# Patient Record
Sex: FEMALE | Race: BLACK OR AFRICAN AMERICAN | NOT HISPANIC OR LATINO | URBAN - METROPOLITAN AREA
[De-identification: names, ages, dates, MRNs, and addresses within clinical notes are randomized per-mention and may not be internally consistent; named-entity substitution may affect disease eponyms.]

---

## 2020-12-01 ENCOUNTER — OFFICE VISIT (OUTPATIENT)
Dept: URBAN - METROPOLITAN AREA CLINIC 118 | Facility: CLINIC | Age: 55
End: 2020-12-01

## 2021-01-21 ENCOUNTER — OFFICE VISIT (OUTPATIENT)
Dept: URBAN - METROPOLITAN AREA CLINIC 118 | Facility: CLINIC | Age: 56
End: 2021-01-21

## 2021-03-08 ENCOUNTER — OFFICE VISIT (OUTPATIENT)
Dept: URBAN - METROPOLITAN AREA CLINIC 118 | Facility: CLINIC | Age: 56
End: 2021-03-08
Payer: OTHER GOVERNMENT

## 2021-03-08 VITALS
HEIGHT: 61 IN | TEMPERATURE: 96.5 F | HEART RATE: 73 BPM | SYSTOLIC BLOOD PRESSURE: 197 MMHG | BODY MASS INDEX: 34.78 KG/M2 | WEIGHT: 184.2 LBS | DIASTOLIC BLOOD PRESSURE: 99 MMHG

## 2021-03-08 DIAGNOSIS — K62.89 RECTAL PAIN: ICD-10-CM

## 2021-03-08 DIAGNOSIS — L29.0 RECTAL ITCHING: ICD-10-CM

## 2021-03-08 DIAGNOSIS — K64.0 GRADE I HEMORRHOIDS: ICD-10-CM

## 2021-03-08 DIAGNOSIS — Z83.71 FAMILY HISTORY OF COLONIC POLYPS: ICD-10-CM

## 2021-03-08 PROCEDURE — 99203 OFFICE O/P NEW LOW 30 MIN: CPT | Performed by: INTERNAL MEDICINE

## 2021-03-08 PROCEDURE — 46221 LIGATION OF HEMORRHOID(S): CPT | Performed by: INTERNAL MEDICINE

## 2021-03-08 NOTE — PHYSICAL EXAM GASTROINTESTINAL
Abdomen , soft, nontender, nondistended , no guarding or rigidity , no masses palpable , normal bowel sounds  Rectal exam RAVEN Mendoza present as a chaperone during the exam.  perianal area with irrigated redundant skin. No external hemorrhoids seen.  Anoscope exam done showing mild 2 columns of internal hemorrhoids, nonbleeding.  Hemorrhoidal banding performed using [facility] banding system at right anterior column.  No immediate complications noted. Patient tolerated it well.

## 2021-03-08 NOTE — HPI-TODAY'S VISIT:
This is a 56 yo female with pmh of HTN, DM, and migraine here for evaluation for rectal pain/itching/bleeding. Patient previously had colonoscopy in 2017 with Dr. Sneed with no colon polyps and hemorrhoids banding x 5 done.  For the past 4-5 months, she has had rectal/anal itching, leading to scratching and bleeding. No rectal bleeding with BM. She denies any abdominal pain or constipation.  Reports mother with colon polyps and grandmother with colon cancer.

## 2021-04-19 ENCOUNTER — OFFICE VISIT (OUTPATIENT)
Dept: URBAN - METROPOLITAN AREA CLINIC 118 | Facility: CLINIC | Age: 56
End: 2021-04-19
Payer: OTHER GOVERNMENT

## 2021-04-19 DIAGNOSIS — K64.8 INTERNAL HEMORRHOIDS WITHOUT MENTION OF COMPLICATION: ICD-10-CM

## 2021-04-19 DIAGNOSIS — K62.89 RECTAL PAIN: ICD-10-CM

## 2021-04-19 DIAGNOSIS — Z83.71 FAMILY HISTORY OF COLONIC POLYPS: ICD-10-CM

## 2021-04-19 DIAGNOSIS — K64.9 HEMORRHOIDS WITHOUT COMPLICATION: ICD-10-CM

## 2021-04-19 DIAGNOSIS — L29.0 RECTAL ITCHING: ICD-10-CM

## 2021-04-19 PROCEDURE — 99213 OFFICE O/P EST LOW 20 MIN: CPT | Performed by: INTERNAL MEDICINE

## 2021-04-19 NOTE — PHYSICAL EXAM GASTROINTESTINAL
Abdomen,  soft, nontender, nondistended,  no guarding or rigidity,  no masses palpable,  normal bowel sounds,

## 2021-04-19 NOTE — PHYSICAL EXAM HENT:
Head,  normocephalic,  atraumatic,  Face,  Face within normal limits,  Ears,  External ears within normal limits,

## 2021-04-19 NOTE — HPI-TODAY'S VISIT:
This is a 56 yo female with pmh of HTN, DM, and migraine here for follow up for rectal pain/itching/bleeding  At her last visit, she had hemorrhoidal banding with [facility]. She reports pressure sensation for some time but improved. No additional rectal bleeding. Her itching has much improved and no additional rectal pain.  Reports regular bowel habits.  Patient previously had colonoscopy in 2017 with Dr. Sneed with no colon polyps and hemorrhoids banding x 5 done.

## 2021-12-09 ENCOUNTER — OFFICE VISIT (OUTPATIENT)
Dept: URBAN - METROPOLITAN AREA CLINIC 118 | Facility: CLINIC | Age: 56
End: 2021-12-09
Payer: OTHER GOVERNMENT

## 2021-12-09 ENCOUNTER — LAB OUTSIDE AN ENCOUNTER (OUTPATIENT)
Dept: URBAN - METROPOLITAN AREA CLINIC 118 | Facility: CLINIC | Age: 56
End: 2021-12-09

## 2021-12-09 DIAGNOSIS — Z83.71 FAMILY HISTORY OF COLONIC POLYPS: ICD-10-CM

## 2021-12-09 DIAGNOSIS — L29.0 RECTAL ITCHING: ICD-10-CM

## 2021-12-09 DIAGNOSIS — K52.9 CHRONIC DIARRHEA: ICD-10-CM

## 2021-12-09 DIAGNOSIS — K64.8 INTERNAL HEMORRHOIDS WITHOUT MENTION OF COMPLICATION: ICD-10-CM

## 2021-12-09 DIAGNOSIS — K58.0 IRRITABLE BOWEL SYNDROME WITH DIARRHEA: ICD-10-CM

## 2021-12-09 DIAGNOSIS — K64.9 HEMORRHOIDS WITHOUT COMPLICATION: ICD-10-CM

## 2021-12-09 DIAGNOSIS — K80.20 CALCULUS OF GALLBLADDER WITHOUT CHOLECYSTITIS WITHOUT OBSTRUCTION: ICD-10-CM

## 2021-12-09 DIAGNOSIS — K62.89 RECTAL PAIN: ICD-10-CM

## 2021-12-09 PROCEDURE — 99214 OFFICE O/P EST MOD 30 MIN: CPT | Performed by: INTERNAL MEDICINE

## 2021-12-09 RX ORDER — CHOLESTYRAMINE 4 G/9G
1 SCOOP POWDER, FOR SUSPENSION ORAL
Qty: 60 | Refills: 2 | OUTPATIENT
Start: 2021-12-09

## 2021-12-09 NOTE — HPI-TODAY'S VISIT:
This is a 55 yo female with pmh of HTN, DM, and migraine here for follow up visit.  Today, she is here after a recent CT spine, which showed enlarged gallbladder with gallstones and sludge.  She has no RUQ pain or nausea/vomiting.  She has had chronic frequent stools. After each meals, she has a BM within 1-2 hours. At times her stools are loose. This has been chronic for past several years. She is on metformin and trulicity for DM.   Patient previously had colonoscopy in 2017 with Dr. Sneed with no colon polyps and hemorrhoids banding x 5 done.

## 2022-01-31 ENCOUNTER — OFFICE VISIT (OUTPATIENT)
Dept: URBAN - METROPOLITAN AREA CLINIC 117 | Facility: CLINIC | Age: 57
End: 2022-01-31
Payer: OTHER GOVERNMENT

## 2022-01-31 DIAGNOSIS — K80.20 GALLSTONE: ICD-10-CM

## 2022-01-31 PROCEDURE — 76705 ECHO EXAM OF ABDOMEN: CPT | Performed by: INTERNAL MEDICINE

## 2022-01-31 RX ORDER — CHOLESTYRAMINE 4 G/9G
1 SCOOP POWDER, FOR SUSPENSION ORAL
Qty: 60 | Refills: 2 | Status: ACTIVE | COMMUNITY
Start: 2021-12-09

## 2022-02-11 ENCOUNTER — OFFICE VISIT (OUTPATIENT)
Dept: URBAN - METROPOLITAN AREA CLINIC 118 | Facility: CLINIC | Age: 57
End: 2022-02-11
Payer: OTHER GOVERNMENT

## 2022-02-11 DIAGNOSIS — Z83.71 FAMILY HISTORY OF COLONIC POLYPS: ICD-10-CM

## 2022-02-11 DIAGNOSIS — L29.0 RECTAL ITCHING: ICD-10-CM

## 2022-02-11 DIAGNOSIS — K76.0 FATTY LIVER: ICD-10-CM

## 2022-02-11 DIAGNOSIS — K64.9 HEMORRHOIDS WITHOUT COMPLICATION: ICD-10-CM

## 2022-02-11 DIAGNOSIS — K64.8 INTERNAL HEMORRHOIDS WITHOUT MENTION OF COMPLICATION: ICD-10-CM

## 2022-02-11 DIAGNOSIS — K80.20 CALCULUS OF GALLBLADDER WITHOUT CHOLECYSTITIS WITHOUT OBSTRUCTION: ICD-10-CM

## 2022-02-11 DIAGNOSIS — K62.89 RECTAL PAIN: ICD-10-CM

## 2022-02-11 DIAGNOSIS — K58.0 IRRITABLE BOWEL SYNDROME WITH DIARRHEA: ICD-10-CM

## 2022-02-11 DIAGNOSIS — K52.9 CHRONIC DIARRHEA: ICD-10-CM

## 2022-02-11 PROCEDURE — 99213 OFFICE O/P EST LOW 20 MIN: CPT | Performed by: INTERNAL MEDICINE

## 2022-02-11 RX ORDER — CHOLESTYRAMINE 4 G/9G
1 SCOOP POWDER, FOR SUSPENSION ORAL
Qty: 60 | Refills: 2 | OUTPATIENT

## 2022-02-11 RX ORDER — CHOLESTYRAMINE 4 G/9G
1 SCOOP POWDER, FOR SUSPENSION ORAL
Qty: 60 | Refills: 2 | Status: DISCONTINUED | COMMUNITY
Start: 2021-12-09

## 2022-02-11 NOTE — HPI-TODAY'S VISIT:
This is a 57 yo female with pmh of HTN, DM, and migraine here for follow up visit.  Since her last visit, she had US, which showed gallstones and sludge as well as fatty liver. Otherwise normal US.  She reports her stools have been improving with cholestyramine. No rectal bleeding or weight loss.  Denies any RUQ pain or nausea/vomiting.

## 2022-02-12 LAB
A/G RATIO: 1.3
ALBUMIN: 4.4
ALKALINE PHOSPHATASE: 64
ALT (SGPT): 18
AST (SGOT): 15
BILIRUBIN, TOTAL: 0.4
BUN/CREATININE RATIO: 14
BUN: 16
CALCIUM: 10
CARBON DIOXIDE, TOTAL: 24
CHLORIDE: 101
CREATININE: 1.13
EGFR IF AFRICN AM: 62
EGFR IF NONAFRICN AM: 54
GLOBULIN, TOTAL: 3.3
GLUCOSE: 108
HBSAG SCREEN: NEGATIVE
HCV AB: 0.1
HEP A AB, IGM: NEGATIVE
HEP B CORE AB, IGM: NEGATIVE
INTERPRETATION:: (no result)
POTASSIUM: 4.2
PROTEIN, TOTAL: 7.7
SODIUM: 144

## 2022-05-12 ENCOUNTER — OFFICE VISIT (OUTPATIENT)
Dept: URBAN - METROPOLITAN AREA CLINIC 118 | Facility: CLINIC | Age: 57
End: 2022-05-12
Payer: OTHER GOVERNMENT

## 2022-05-12 ENCOUNTER — LAB OUTSIDE AN ENCOUNTER (OUTPATIENT)
Dept: URBAN - METROPOLITAN AREA CLINIC 118 | Facility: CLINIC | Age: 57
End: 2022-05-12

## 2022-05-12 ENCOUNTER — DASHBOARD ENCOUNTERS (OUTPATIENT)
Age: 57
End: 2022-05-12

## 2022-05-12 DIAGNOSIS — K64.8 INTERNAL HEMORRHOIDS WITHOUT MENTION OF COMPLICATION: ICD-10-CM

## 2022-05-12 DIAGNOSIS — K58.0 IRRITABLE BOWEL SYNDROME WITH DIARRHEA: ICD-10-CM

## 2022-05-12 DIAGNOSIS — K64.9 HEMORRHOIDS WITHOUT COMPLICATION: ICD-10-CM

## 2022-05-12 DIAGNOSIS — Z83.71 FAMILY HISTORY OF COLONIC POLYPS: ICD-10-CM

## 2022-05-12 DIAGNOSIS — L29.0 RECTAL ITCHING: ICD-10-CM

## 2022-05-12 DIAGNOSIS — K62.89 RECTAL PAIN: ICD-10-CM

## 2022-05-12 DIAGNOSIS — K80.20 CALCULUS OF GALLBLADDER WITHOUT CHOLECYSTITIS WITHOUT OBSTRUCTION: ICD-10-CM

## 2022-05-12 DIAGNOSIS — K76.0 FATTY LIVER: ICD-10-CM

## 2022-05-12 PROCEDURE — 99213 OFFICE O/P EST LOW 20 MIN: CPT | Performed by: INTERNAL MEDICINE

## 2022-05-12 RX ORDER — POLYETHYLENE GLYCOL 3350, SODIUM SULFATE, SODIUM CHLORIDE, POTASSIUM CHLORIDE, ASCORBIC ACID, SODIUM ASCORBATE 140-9-5.2G
AS DIRECTED KIT ORAL ONCE
Qty: 1 | Refills: 0 | OUTPATIENT
Start: 2022-05-12 | End: 2022-05-13

## 2022-05-12 RX ORDER — CHOLESTYRAMINE 4 G/9G
1 SCOOP POWDER, FOR SUSPENSION ORAL
Qty: 60 | Refills: 2 | OUTPATIENT

## 2022-05-12 RX ORDER — CHOLESTYRAMINE 4 G/9G
1 SCOOP POWDER, FOR SUSPENSION ORAL
Qty: 60 | Refills: 2 | Status: ACTIVE | COMMUNITY

## 2022-05-12 NOTE — HPI-TODAY'S VISIT:
This is a 57 yo female with pmh of HTN, DM, and migraine here for follow up visit.  Doing well with diarrhea well controlled. Takes cholestyramine once daily. No abdominal pain or nausea/vomiting.

## 2022-05-13 PROBLEM — 197321007: Status: ACTIVE | Noted: 2022-02-11

## 2022-05-13 PROBLEM — 70342003: Status: ACTIVE | Noted: 2021-12-09

## 2022-05-13 PROBLEM — 197125005: Status: ACTIVE | Noted: 2021-12-09

## 2022-08-03 ENCOUNTER — OFFICE VISIT (OUTPATIENT)
Dept: URBAN - METROPOLITAN AREA SURGERY CENTER 23 | Facility: SURGERY CENTER | Age: 57
End: 2022-08-03
Payer: OTHER GOVERNMENT

## 2022-08-03 ENCOUNTER — CLAIMS CREATED FROM THE CLAIM WINDOW (OUTPATIENT)
Dept: URBAN - METROPOLITAN AREA CLINIC 4 | Facility: CLINIC | Age: 57
End: 2022-08-03
Payer: OTHER GOVERNMENT

## 2022-08-03 DIAGNOSIS — Z83.71 FAMILY HISTORY OF COLON POLYPS: ICD-10-CM

## 2022-08-03 DIAGNOSIS — D12.4 ADENOMA OF DESCENDING COLON: ICD-10-CM

## 2022-08-03 DIAGNOSIS — K63.89 OTHER SPECIFIED DISEASES OF INTESTINE: ICD-10-CM

## 2022-08-03 DIAGNOSIS — D12.4 BENIGN NEOPLASM OF DESCENDING COLON: ICD-10-CM

## 2022-08-03 DIAGNOSIS — Z12.11 COLON CANCER SCREENING: ICD-10-CM

## 2022-08-03 PROCEDURE — 45380 COLONOSCOPY AND BIOPSY: CPT | Performed by: INTERNAL MEDICINE

## 2022-08-03 PROCEDURE — 45385 COLONOSCOPY W/LESION REMOVAL: CPT | Performed by: INTERNAL MEDICINE

## 2022-08-03 PROCEDURE — 88305 TISSUE EXAM BY PATHOLOGIST: CPT | Performed by: PATHOLOGY

## 2022-08-03 PROCEDURE — G8907 PT DOC NO EVENTS ON DISCHARG: HCPCS | Performed by: INTERNAL MEDICINE

## 2022-08-03 RX ORDER — CHOLESTYRAMINE 4 G/9G
1 SCOOP POWDER, FOR SUSPENSION ORAL
Qty: 60 | Refills: 2 | Status: ACTIVE | COMMUNITY

## 2025-08-13 ENCOUNTER — OFFICE VISIT (OUTPATIENT)
Dept: URBAN - METROPOLITAN AREA CLINIC 118 | Facility: CLINIC | Age: 60
End: 2025-08-13